# Patient Record
Sex: MALE | Race: WHITE | URBAN - METROPOLITAN AREA
[De-identification: names, ages, dates, MRNs, and addresses within clinical notes are randomized per-mention and may not be internally consistent; named-entity substitution may affect disease eponyms.]

---
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
## 2019-04-03 ENCOUNTER — TELEPHONE (OUTPATIENT)
Dept: NEUROLOGY | Facility: CLINIC | Age: 19
End: 2019-04-03

## 2019-04-03 NOTE — TELEPHONE ENCOUNTER
[facility] Call Center    Phone Message    May a detailed message be left on voicemail: yes    Reason for Call: Other: Pt's mother Diana calling to say that her son got a text message about an appt with Jovany Prakash this Friday, but she was under the impression that the appt was moved to next Friday, April 12th when he sees Dr. Colbert. Pt's mother states that they are coming from far away and will not be able to make that appt with Jovany Prakash this Friday and are wondering if that can be moved to the same day the pt sees Dr. Colbert. Please give pt's mother a call back to advise.     Action Taken: Message routed to:  [facility] Neurology     Called pt's mom and had to leave message that Christiano will be seeing Dr. Colbert and Prem Prakash at the same appointment.

## 2019-04-09 ENCOUNTER — TELEPHONE (OUTPATIENT)
Dept: NEUROLOGY | Facility: CLINIC | Age: 19
End: 2019-04-09

## 2019-04-09 NOTE — TELEPHONE ENCOUNTER
Pt's MRI's are not in PAC's. Called [facility] in [place], had to leave message with the film room, asked them to push the films to [facility] and fax radiology report to clinic.

## 2019-04-09 NOTE — TELEPHONE ENCOUNTER
Health Call Center    Phone Message    May a detailed message be left on voicemail: yes    Reason for Call: Other: Pt's mom called to follow up with Janie regarding her previous call. Please give her a call back.     Action Taken: Message routed to:  [facility] Neurology     Left message for pt's mom that Toni has been rescheduled for May 17th at 11:30am.

## 2019-04-09 NOTE — TELEPHONE ENCOUNTER
[facility] Call Center    Phone Message    May a detailed message be left on voicemail: yes    Reason for Call: Other: Pt's mom called to see what their options for rescheduling the pt's appointment this Friday would be due to the coming snow storm. Please give her a call back.     Action Taken: Message routed to:  [facility] Neurology

## 2019-05-17 ENCOUNTER — OFFICE VISIT (OUTPATIENT)
Dept: NEUROLOGY | Facility: CLINIC | Age: 19
End: 2019-05-17
Payer: COMMERCIAL

## 2019-05-17 VITALS
OXYGEN SATURATION: 100 % | HEART RATE: 73 BPM | DIASTOLIC BLOOD PRESSURE: 65 MMHG | SYSTOLIC BLOOD PRESSURE: 128 MMHG | WEIGHT: 173 LBS | HEIGHT: 66 IN | BODY MASS INDEX: 27.8 KG/M2

## 2019-05-17 DIAGNOSIS — R27.0 ATAXIA: Primary | ICD-10-CM

## 2019-05-17 DIAGNOSIS — R27.0 ATAXIA: ICD-10-CM

## 2019-05-17 PROBLEM — R62.50 DEVELOPMENT DELAY: Status: ACTIVE | Noted: 2018-07-26

## 2019-05-17 PROBLEM — G11.8 SPINOCEREBELLAR ATAXIA (H): Status: ACTIVE | Noted: 2018-10-18

## 2019-05-17 PROBLEM — Z82.0: Status: ACTIVE | Noted: 2018-10-18

## 2019-05-17 LAB
AFP SERPL-MCNC: 2.4 UG/L (ref 0–8)
VIT B12 SERPL-MCNC: 690 PG/ML (ref 193–986)

## 2019-05-17 RX ORDER — FLUTICASONE PROPIONATE 50 MCG
2 SPRAY, SUSPENSION (ML) NASAL
COMMUNITY
Start: 2018-07-26

## 2019-05-17 RX ORDER — MONTELUKAST SODIUM 10 MG/1
10 TABLET ORAL
COMMUNITY
Start: 2018-07-26

## 2019-05-17 SDOH — HEALTH STABILITY: MENTAL HEALTH: HOW OFTEN DO YOU HAVE A DRINK CONTAINING ALCOHOL?: NEVER

## 2019-05-17 ASSESSMENT — MIFFLIN-ST. JEOR: SCORE: 1734.53

## 2019-05-17 ASSESSMENT — PAIN SCALES - GENERAL: PAINLEVEL: NO PAIN (0)

## 2019-05-17 NOTE — LETTER
"5/17/2019       RE: Christiano Nava  [address] [place] [id number]     Dear Colleague,    Thank you for referring your patient, Christiano Nava, to the [facility] NEUROLOGY at [facility]. Please see a copy of my visit note below.    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 His father was seen separately in our clinic today.  We spent 60 minutes reviewing family history and discussing genetic testing options.    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  He has participated in special education in school.  He does not have a precise genetic diagnosis and per his mother's report, he has not had any type of genetics evaluation (including microarray) to evaluate his developmental issues.    FAMILY HISTORY-see scanned pedigree  1. Christiano's father is affected with a similar congenital onset ataxia. From his father's report, he was formally diagnosed in his 20's- but was clumsy all of his life and did have some special education classes in school. His father had genetic testing for the common repeat-mediated dominant ataxias (e.g. SCA1,2,3,6,7,8) and this testing was negative.  2. Christiano's mother reported that Christiano's paternal grandfather may also be affected and has had issues with falls.  Christiano's father did not agree with this assessment of his father- but indicated that his father may have some mild cognitive issues.  3. Christiano has a younger sister who has not been evaluated for the familial ataxia.  4. Christiano has a paternal half sister with developmental issues including hypotonia.  In talking with Christiano's father and his new wife, they do not believe that this sister has the \"same thing\" as Christiano.  They indicated that they had a genetics workup through [place] and a maternally inherited " chromosome abnormality may have been the explanation- but they were not certain of this information and did not have records.    GENETIC COUNSELING-  We discussed the genetic and environmental basis of neurologic disease. I explained that in most cases, it results from complex and lifelong interaction of multiple genetic and environmental factors.  In some rare cases, there may be a single gene cause. I explained that in Christiano's case, the clear presence of ataxia in two successive male generations (and possibly a third)- establishes a likely autosomal dominant form of ataxia in the family.    We discussed the implications of autosomal dominant inheritance. I explained that with this pattern of inheritance, we would expect a 50% risk to any future children for Christiano. In addition, we would also expect that his full sister is at 50% risk.  We did briefly discuss other patterns of inheritance, but given the reported family history, we believe a dominant pattern is most likely.    We discussed the utility of genetic testing.  I explained that many forms of ataxia may be accompanied by additional neurologic or non-neurologic findings.  Thus, establishing a precise diagnosis is critical for our medical management of Christiano- it will inform our screening recommendations for other health complications. In addition, it will provide us with valuable prognostic guidance as Christiano establishes where he plans to live and the type of work that he wants to pursue.    This information also has critical reproductive implications.  Christiano is a young man who may be starting a family in the next few years- knowing the precise genetic diagnosis will allow him to make informed reproductive decisions. We discussed options that might be available to him including pre-implantation genetic diagnosis.  I indicated that we could discuss such options in more detail in the future.  In addition, Christiano's younger sister would also be at 50% risk for the  familial ataxia.  While she is reported to be unaffected, she has not had a formal neurologic evaluation to confirm whether or not she is also affected.  Identifying a precise genetic diagnosis in Christiano would allow us to more precisely assess his sister.     In summary, Christiano has a lifelong neurologic and developmental disorder.  Given that he has never had any type of genetic workup- it would seem most prudent to begin by performing a chromosomal microarray- this is the frontline testing recommended by the American Academy of pediatrics for any child manifesting neurologic/congitive delays.  We will request prior authorization for this testing.  If this testing is negative, we would like to proceed with genetic testing either for an ataxia panel- or we also discussed whole exome sequencing as a possibility given that the common autosomal dominant repeat-mediated ataxias have already been excluded in this family.    We discussed risks benefits and limitations of these testing options- specifically we reviewed issues with incidental findings in whole exome sequencing.  Christiano provided written informed consent for genetic testing by microarray and for whole exome sequencing.      Prem Prakash MS, [facility]  Licensed Genetic Counselor

## 2019-05-17 NOTE — PROGRESS NOTES
"[facility] - New Patient    CC: ataxia    HPI: Christiano Nava is a 18 year-old male with a history of strabismus and hyperopia who presents for evaluation of ataxia. His mother reports he had motor and speech delays, and also has had intellectual delays and was in special education. He has always had poor balance, but walks independently. He thinks for about the past 3-4 years he has had some worsening in balance. Falling about once a week currently.    Denies dysphagia, diplopia, bladder symptoms, or cramps.    His father has had longstanding coordination issues and reportedly had genetic testing which was negative.     His mother (who is now  from his father) feels like his paternal grandfather also had some imbalance but refused testing.      Past Medical History:   Diagnosis Date     Seasonal allergies      Past Surgical History:   Procedure Laterality Date     STRABISMUS SURGERY         Current Outpatient Medications   Medication     fluticasone (FLONASE) 50 MCG/ACT nasal spray     montelukast (SINGULAIR) 10 MG tablet     No current facility-administered medications for this visit.        Social History     Social History Narrative     Not on file       Family History   Problem Relation Age of Onset     Ataxia Father         late 20s, see pedigree for more details     Other - See Comments Paternal Grandfather         questionable late ataxia       Remainder of complete ROS negative    Exam:  /65 (BP Location: Left arm, Patient Position: Sitting, Cuff Size: Adult Large)   Pulse 73   Ht 1.664 m (5' 5.5\")   Wt 78.5 kg (173 lb)   SpO2 100%   BMI 28.35 kg/m    No acute distress  Breathing comfortably  No peripheral edema    Neurological Examination (R/L):  Mental Status: Awake, alert. Fluent. Affect normal.  Cranial Nerves: Visual fields intact to confrontation. Versions saccadic with pathologic-appearing endgaze nystagmus. Saccades intact. No hypomimia. No " hypophonia. Tongue protrudes midline. Question slight dysarthria  Motor: Pronator drift was absent. Finger tapping, hand opening/closing, arm pronation/supination without bradykinesia or hesitations. Heel and toe tapping without bradykinesia or hesitations. Tone was normal. No resting tremor. Strength was full in the upper and lower extremities.  Sensory: Vibration intact at the toes bilaterally. Romberg borderline positive.  Reflexes: Biceps 2/2 Triceps 2/2 Brachioradialis 2/2 Patellar 3/3 Achilles 3/3. Toes were downgoing bilaterally.  Coordination: Finger to nose and heel to shin with moderate dysmetria.  Gait: Can rise from chair with arms crossed. Gait slightly wide-based. Can stand with feet together. Can  tandem for about 3-4 seconds.    MRI:  Moderate cerebellar atrophy. Thick corpus callosum.    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 His father appears to have a similar syndrome, which would be suggestive of a dominant ataxia. Will attempt to obtain his father's records. Pending results may consider whole exome sequencing.    Recommendations:   -will pursue further genetic testing    Inder Aleman MD  Movement Disorders Fellow

## 2019-05-17 NOTE — LETTER
"5/17/2019       RE: Christiano Nava  [address] [place] [id number]     Dear Colleague,    Thank you for referring your patient, Christiano Nava, to the [facility] NEUROLOGY at [facility]. Please see a copy of my visit note below.    [facility] - New Patient    CC: ataxia    HPI: Christiano Nava is a 18 year-old male with a history of strabismus and hyperopia who presents for evaluation of ataxia. His mother reports he had motor and speech delays, and also has had intellectual delays and was in special education. He has always had poor balance, but walks independently. He thinks for about the past 3-4 years he has had some worsening in balance. Falling about once a week currently.    Denies dysphagia, diplopia, bladder symptoms, or cramps.    His father has had longstanding coordination issues and reportedly had genetic testing which was negative.     His mother (who is now  from his father) feels like his paternal grandfather also had some imbalance but refused testing.    Past Medical History:   Diagnosis Date     Seasonal allergies      Past Surgical History:   Procedure Laterality Date     STRABISMUS SURGERY         Current Outpatient Medications   Medication     fluticasone (FLONASE) 50 MCG/ACT nasal spray     montelukast (SINGULAIR) 10 MG tablet     No current facility-administered medications for this visit.        Social History     Social History Narrative     Not on file       Family History   Problem Relation Age of Onset     Ataxia Father         late 20s, see pedigree for more details     Other - See Comments Paternal Grandfather         questionable late ataxia       Remainder of complete ROS negative    Exam:  /65 (BP Location: Left arm, Patient Position: Sitting, Cuff Size: Adult Large)   Pulse 73   Ht 1.664 m (5' 5.5\")   Wt 78.5 kg (173 lb)   SpO2 100%   BMI 28.35 kg/m     No acute distress  Breathing " comfortably  No peripheral edema    Neurological Examination (R/L):  Mental Status: Awake, alert. Fluent. Affect normal.  Cranial Nerves: Visual fields intact to confrontation. Versions saccadic with pathologic-appearing endgaze nystagmus. Saccades intact. No hypomimia. No hypophonia. Tongue protrudes midline. Question slight dysarthria  Motor: Pronator drift was absent. Finger tapping, hand opening/closing, arm pronation/supination without bradykinesia or hesitations. Heel and toe tapping without bradykinesia or hesitations. Tone was normal. No resting tremor. Strength was full in the upper and lower extremities.  Sensory: Vibration intact at the toes bilaterally. Romberg borderline positive.  Reflexes: Biceps 2/2 Triceps 2/2 Brachioradialis 2/2 Patellar 3/3 Achilles 3/3. Toes were downgoing bilaterally.  Coordination: Finger to nose and heel to shin with moderate dysmetria.  Gait: Can rise from chair with arms crossed. Gait slightly wide-based. Can stand with feet together. Can  tandem for about 3-4 seconds.    MRI:  Moderate cerebellar atrophy. Thick corpus callosum.    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 His father appears to have a similar syndrome, which would be suggestive of a dominant ataxia. Will attempt to obtain his father's records. Pending results may consider whole exome sequencing.    Recommendations:   -will pursue further genetic testing    Inder Aleman MD  Movement Disorders Fellow    Marisol Colbert MD

## 2019-05-17 NOTE — NURSING NOTE
Pt is her for an ataxia evaluation. He has occasional headaches. He said he doesn't have double or blurred vision. His last eye exam was in Aug. 2018. He said he does not choke when he eats or drinks. H said his arm strength remains strong. He does not have problems with bowel or bladder habits. He said he falls about once per week. He catches himself a lot so he doesn't fall. He has not been to physical therapy. He said he sleeps ok at night. He said he is not sleepy during the day.

## 2019-05-20 LAB
A-TOCOPHEROL VIT E SERPL-MCNC: 10.2 MG/L (ref 5.5–18)
BETA+GAMMA TOCOPHEROL SERPL-MCNC: 1.1 MG/L (ref 0–6)
COPATH REPORT: NORMAL

## 2019-05-30 NOTE — PROGRESS NOTES
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 His father was seen separately in our clinic today.  We spent 60 minutes reviewing family history and discussing genetic testing options.    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  He has participated in special education in school.  He does not have a precise genetic diagnosis and per his mother's report, he has not had any type of genetics evaluation (including microarray) to evaluate his developmental issues.    FAMILY HISTORY-see scanned pedigree  1. Christiano's father is affected with a similar congenital onset ataxia. From his father's report, he was formally diagnosed in his 20's- but was clumsy all of his life and did have some special education classes in school. His father had genetic testing for the common repeat-mediated dominant ataxias (e.g. SCA1,2,3,6,7,8) and this testing was negative.  2. Christiano's mother reported that Christiano's paternal grandfather may also be affected and has had issues with falls.  Christiano's father did not agree with this assessment of his father- but indicated that his father may have some mild cognitive issues.  3. Christiano has a younger sister who has not been evaluated for the familial ataxia.  4. Christiano has a paternal half sister with developmental issues including hypotonia.  In talking with Christiano's father and his new wife, they do not believe that this sister has the \"same thing\" as Christiano.  They indicated that they had a genetics workup through [place] and a maternally inherited chromosome abnormality may have been the explanation- but they were not certain of this information and did not have records.    GENETIC COUNSELING-  We discussed the genetic and environmental basis of neurologic disease. I explained that in most cases, it results from complex " and lifelong interaction of multiple genetic and environmental factors.  In some rare cases, there may be a single gene cause. I explained that in Christiano's case, the clear presence of ataxia in two successive male generations (and possibly a third)- establishes a likely autosomal dominant form of ataxia in the family.    We discussed the implications of autosomal dominant inheritance. I explained that with this pattern of inheritance, we would expect a 50% risk to any future children for Christiano. In addition, we would also expect that his full sister is at 50% risk.  We did briefly discuss other patterns of inheritance, but given the reported family history, we believe a dominant pattern is most likely.    We discussed the utility of genetic testing.  I explained that many forms of ataxia may be accompanied by additional neurologic or non-neurologic findings.  Thus, establishing a precise diagnosis is critical for our medical management of Christiano- it will inform our screening recommendations for other health complications. In addition, it will provide us with valuable prognostic guidance as Christiano establishes where he plans to live and the type of work that he wants to pursue.    This information also has critical reproductive implications.  Christiano is a young man who may be starting a family in the next few years- knowing the precise genetic diagnosis will allow him to make informed reproductive decisions. We discussed options that might be available to him including pre-implantation genetic diagnosis.  I indicated that we could discuss such options in more detail in the future.  In addition, Christiano's younger sister would also be at 50% risk for the familial ataxia.  While she is reported to be unaffected, she has not had a formal neurologic evaluation to confirm whether or not she is also affected.  Identifying a precise genetic diagnosis in Christiano would allow us to more precisely assess his sister.     In summary, Christiano  has a lifelong neurologic and developmental disorder.  Given that he has never had any type of genetic workup- it would seem most prudent to begin by performing a chromosomal microarray- this is the frontline testing recommended by the American Academy of pediatrics for any child manifesting neurologic/congitive delays.  We will request prior authorization for this testing.  If this testing is negative, we would like to proceed with genetic testing either for an ataxia panel- or we also discussed whole exome sequencing as a possibility given that the common autosomal dominant repeat-mediated ataxias have already been excluded in this family.    We discussed risks benefits and limitations of these testing options- specifically we reviewed issues with incidental findings in whole exome sequencing.  Christiano provided written informed consent for genetic testing by microarray and for whole exome sequencing.      Prem Prakash MS, [facility]  Licensed Genetic Counselor

## 2019-06-24 ENCOUNTER — TELEPHONE (OUTPATIENT)
Dept: LAB | Facility: CLINIC | Age: 19
End: 2019-06-24

## 2019-06-24 DIAGNOSIS — R62.50 DEVELOPMENTAL DELAY: ICD-10-CM

## 2019-06-24 DIAGNOSIS — R27.0 ATAXIA: Primary | ICD-10-CM

## 2019-06-24 NOTE — TELEPHONE ENCOUNTER
I called 06/24/19 to update Diana on insurance coverage for Christiano's genetic testing (PA approval was received). However, I was unable to reach Diana.  I left a non-detailed voicemail with my name and phone number.    Nina Palacios  Genomics Billing    [facility]   Molecular Diagnostics Laboratory  [address] [place] [id number]  [phone]

## 2019-06-24 NOTE — TELEPHONE ENCOUNTER
Notified Diana, patient's mother, that we received prior authorization approval for genetic testing. Valid from 05/17/19 to 07/15/19. Authorization number is [id number].     Explained that insurance benefits may still apply, therefore, there could be an out of pocket cost.    Diana expressed understanding and stated that she wants Christiano to proceed with testing. We will call when results are available. Diana had no further questions.    Nina Palacios  Genomics Billing    [facility]   Molecular Diagnostics Laboratory  [address] [place] [id number]  [phone]

## 2019-07-08 LAB — COPATH REPORT: NORMAL

## 2019-08-06 ENCOUNTER — TELEPHONE (OUTPATIENT)
Dept: NEUROLOGY | Facility: CLINIC | Age: 19
End: 2019-08-06

## 2019-08-06 NOTE — TELEPHONE ENCOUNTER
GENETIC COUNSELING-Ataxia clinic  I left a message for Christiano indicating that the chromosomal microarray testing is normal.  This testing did not identify a clear genetic basis for his neurologic symptoms/developmental delays. I indicated that the next test that we were considering is exome sequencing and asked him to contact me if he wants to pursue this.    Prem Prakash MS, [facility]  Licensed Genetic Counselor

## 2019-11-06 ENCOUNTER — HEALTH MAINTENANCE LETTER (OUTPATIENT)
Age: 19
End: 2019-11-06

## 2020-11-29 ENCOUNTER — HEALTH MAINTENANCE LETTER (OUTPATIENT)
Age: 20
End: 2020-11-29

## 2021-09-19 ENCOUNTER — HEALTH MAINTENANCE LETTER (OUTPATIENT)
Age: 21
End: 2021-09-19

## 2022-01-09 ENCOUNTER — HEALTH MAINTENANCE LETTER (OUTPATIENT)
Age: 22
End: 2022-01-09

## 2022-11-21 ENCOUNTER — HEALTH MAINTENANCE LETTER (OUTPATIENT)
Age: 22
End: 2022-11-21

## 2023-04-16 ENCOUNTER — HEALTH MAINTENANCE LETTER (OUTPATIENT)
Age: 23
End: 2023-04-16